# Patient Record
(demographics unavailable — no encounter records)

---

## 2018-10-17 NOTE — RAD
LEFT KNEE FOUR VIEWS:

 

INDICATIONS:

Left knee pain without trauma.

 

COMPARISON:

None.

 

FINDINGS:

No acute fracture or subluxation is evident.  No definite joint capsular distention is noted.

 

IMPRESSION:

No acute osseous abnormality.

 

POS: ELBA